# Patient Record
Sex: MALE | Race: WHITE | NOT HISPANIC OR LATINO | Employment: UNEMPLOYED | URBAN - METROPOLITAN AREA
[De-identification: names, ages, dates, MRNs, and addresses within clinical notes are randomized per-mention and may not be internally consistent; named-entity substitution may affect disease eponyms.]

---

## 2023-03-08 ENCOUNTER — HOSPITAL ENCOUNTER (EMERGENCY)
Facility: HOSPITAL | Age: 53
Discharge: HOME | End: 2023-03-08
Attending: EMERGENCY MEDICINE | Admitting: EMERGENCY MEDICINE

## 2023-03-08 VITALS
OXYGEN SATURATION: 99 % | SYSTOLIC BLOOD PRESSURE: 173 MMHG | DIASTOLIC BLOOD PRESSURE: 104 MMHG | BODY MASS INDEX: 23.95 KG/M2 | TEMPERATURE: 96 F | RESPIRATION RATE: 16 BRPM | HEIGHT: 68 IN | HEART RATE: 96 BPM | WEIGHT: 158 LBS

## 2023-03-08 DIAGNOSIS — L73.9 FOLLICULITIS: Primary | ICD-10-CM

## 2023-03-08 RX ORDER — DOXYCYCLINE 100 MG/1
100 CAPSULE ORAL 2 TIMES DAILY
Qty: 20 CAPSULE | Refills: 0 | Status: SHIPPED | OUTPATIENT
Start: 2023-03-08 | End: 2023-03-18

## 2023-03-08 NOTE — DISCHARGE INSTRUCTIONS
You were seen in the ER for a skin rash  I do NOT think it is contagious or from insects such as bed bugs  Take the antibiotics as directed  Come back to the ER if you get sick such as with a fever or vomiting or a spreading rash

## 2023-03-08 NOTE — ED PROVIDER NOTES
History  Chief Complaint   Patient presents with   • Rash     Patient presenting with scabbed over raised red lesions to arms, legs, forehead, and nose for 3 months  Patient reports being sent over from [facility] due to itching and pain from sites  49 yo man sent to ED from [address] [place] [address] for diffuse itching rash x several months  Notes associated surrounding erythema and scabs  Denies meth use  At [facility] for etoh use  No fever or systemic illness  No vomiting or weakness  None       History reviewed  No pertinent past medical history  History reviewed  No pertinent surgical history  History reviewed  No pertinent family history  I have reviewed and agree with the history as documented  E-Cigarette/Vaping   • E-Cigarette Use Never User      E-Cigarette/Vaping Substances     Social History     Tobacco Use   • Smoking status: Every Day     Packs/day: 0 50     Types: Cigarettes   • Smokeless tobacco: Never   Vaping Use   • Vaping Use: Never used   Substance Use Topics   • Alcohol use: Not Currently     Comment: in recovery   • Drug use: Never       Review of Systems   Skin: Positive for rash  Physical Exam  Physical Exam  Vitals and nursing note reviewed  Constitutional:       General: He is not in acute distress  Appearance: He is well-developed  He is not diaphoretic  HENT:      Head: Normocephalic and atraumatic  Eyes:      Conjunctiva/sclera: Conjunctivae normal       Pupils: Pupils are equal, round, and reactive to light  Neck:      Vascular: No JVD  Cardiovascular:      Rate and Rhythm: Normal rate and regular rhythm  Heart sounds: Normal heart sounds  No murmur heard  No friction rub  No gallop  Pulmonary:      Effort: Pulmonary effort is normal  No respiratory distress  Breath sounds: Normal breath sounds  No stridor  No wheezing or rales  Abdominal:      General: There is no distension  Palpations: Abdomen is soft  Tenderness:  There is no abdominal tenderness  Musculoskeletal:         General: No tenderness or deformity  Normal range of motion  Cervical back: Normal range of motion and neck supple  Skin:     General: Skin is warm and dry  Capillary Refill: Capillary refill takes less than 2 seconds  Findings: Rash present  Comments: Scattered rash on upper and lower extremities c/w folliculitis without cellulitis or abscess  Neurological:      Mental Status: He is alert and oriented to person, place, and time  Cranial Nerves: No cranial nerve deficit  Sensory: No sensory deficit  Motor: No abnormal muscle tone  Coordination: Coordination normal          Vital Signs  ED Triage Vitals [03/08/23 1611]   Temperature Pulse Respirations Blood Pressure SpO2   (!) 96 °F (35 6 °C) 96 16 (!) 173/104 99 %      Temp src Heart Rate Source Patient Position - Orthostatic VS BP Location FiO2 (%)   -- Monitor Sitting Left arm --      Pain Score       --           Vitals:    03/08/23 1611   BP: (!) 173/104   Pulse: 96   Patient Position - Orthostatic VS: Sitting         Visual Acuity      ED Medications  Medications - No data to display    Diagnostic Studies  Results Reviewed     None                 No orders to display              Procedures  Procedures         ED Course                               SBIRT 20yo+    Flowsheet Row Most Recent Value   SBIRT (23 yo +)    In order to provide better care to our patients, we are screening all of our patients for alcohol and drug use  Would it be okay to ask you these screening questions? Yes Filed at: 03/08/2023 1621   Initial Alcohol Screen: US AUDIT-C     1  How often do you have a drink containing alcohol? 0 Filed at: 03/08/2023 1621   2  How many drinks containing alcohol do you have on a typical day you are drinking? 0 Filed at: 03/08/2023 1621   3a  Male UNDER 65: How often do you have five or more drinks on one occasion?  0 Filed at: 03/08/2023 1621   Audit-C Score 0 Filed at: 03/08/2023 1621   IRISH: How many times in the past year have you    Used an illegal drug or used a prescription medication for non-medical reasons? Never Filed at: 03/08/2023 1621                    Medical Decision Making  Folliculitis: chronic illness or injury  Risk  Prescription drug management  Disposition  Final diagnoses: Folliculitis     Time reflects when diagnosis was documented in both MDM as applicable and the Disposition within this note     Time User Action Codes Description Comment    3/8/2023  4:16 PM Ly Areas Add [H56 2] Folliculitis       ED Disposition     ED Disposition   Discharge    Condition   Stable    Date/Time   Wed Mar 8, 2023  4:16 PM    Comment   Roni Sal discharge to home/self care  Follow-up Information    None         Discharge Medication List as of 3/8/2023  4:17 PM      START taking these medications    Details   doxycycline monohydrate (MONODOX) 100 mg capsule Take 1 capsule (100 mg total) by mouth 2 (two) times a day for 10 days, Starting Wed 3/8/2023, Until Sat 3/18/2023, Print             No discharge procedures on file      PDMP Review     None          ED Provider  Electronically Signed by           Yohan Garcia MD  03/08/23 2100

## 2023-03-09 ENCOUNTER — HOSPITAL ENCOUNTER (EMERGENCY)
Facility: HOSPITAL | Age: 53
Discharge: HOME | End: 2023-03-09
Attending: EMERGENCY MEDICINE

## 2023-03-09 VITALS
SYSTOLIC BLOOD PRESSURE: 128 MMHG | DIASTOLIC BLOOD PRESSURE: 82 MMHG | HEART RATE: 76 BPM | RESPIRATION RATE: 19 BRPM | TEMPERATURE: 97.9 F | OXYGEN SATURATION: 97 %

## 2023-03-09 DIAGNOSIS — R11.2 NAUSEA AND VOMITING: Primary | ICD-10-CM

## 2023-03-09 LAB
ALBUMIN SERPL BCP-MCNC: 4 G/DL (ref 3.5–5)
ALP SERPL-CCNC: 75 U/L (ref 34–104)
ALT SERPL W P-5'-P-CCNC: 15 U/L (ref 7–52)
ANION GAP SERPL CALCULATED.3IONS-SCNC: 6 MMOL/L (ref 4–13)
AST SERPL W P-5'-P-CCNC: 16 U/L (ref 13–39)
BASOPHILS # BLD AUTO: 0.03 THOUSANDS/ÂΜL (ref 0–0.1)
BASOPHILS NFR BLD AUTO: 1 % (ref 0–1)
BILIRUB SERPL-MCNC: 0.48 MG/DL (ref 0.2–1)
BUN SERPL-MCNC: 9 MG/DL (ref 5–25)
CALCIUM SERPL-MCNC: 9.4 MG/DL (ref 8.4–10.2)
CHLORIDE SERPL-SCNC: 101 MMOL/L (ref 96–108)
CO2 SERPL-SCNC: 27 MMOL/L (ref 21–32)
CREAT SERPL-MCNC: 0.77 MG/DL (ref 0.6–1.3)
EOSINOPHIL # BLD AUTO: 0.08 THOUSAND/ÂΜL (ref 0–0.61)
EOSINOPHIL NFR BLD AUTO: 2 % (ref 0–6)
ERYTHROCYTE [DISTWIDTH] IN BLOOD BY AUTOMATED COUNT: 12.4 % (ref 11.6–15.1)
GFR SERPL CREATININE-BSD FRML MDRD: 105 ML/MIN/1.73SQ M
GLUCOSE SERPL-MCNC: 99 MG/DL (ref 65–140)
HCT VFR BLD AUTO: 38.9 % (ref 36.5–49.3)
HGB BLD-MCNC: 13.6 G/DL (ref 12–17)
IMM GRANULOCYTES # BLD AUTO: 0.01 THOUSAND/UL (ref 0–0.2)
IMM GRANULOCYTES NFR BLD AUTO: 0 % (ref 0–2)
LIPASE SERPL-CCNC: 6 U/L (ref 11–82)
LYMPHOCYTES # BLD AUTO: 1.31 THOUSANDS/ÂΜL (ref 0.6–4.47)
LYMPHOCYTES NFR BLD AUTO: 25 % (ref 14–44)
MCH RBC QN AUTO: 31.9 PG (ref 26.8–34.3)
MCHC RBC AUTO-ENTMCNC: 35 G/DL (ref 31.4–37.4)
MCV RBC AUTO: 91 FL (ref 82–98)
MONOCYTES # BLD AUTO: 0.49 THOUSAND/ÂΜL (ref 0.17–1.22)
MONOCYTES NFR BLD AUTO: 10 % (ref 4–12)
NEUTROPHILS # BLD AUTO: 3.26 THOUSANDS/ÂΜL (ref 1.85–7.62)
NEUTS SEG NFR BLD AUTO: 62 % (ref 43–75)
NRBC BLD AUTO-RTO: 0 /100 WBCS
PLATELET # BLD AUTO: 258 THOUSANDS/UL (ref 149–390)
PMV BLD AUTO: 10.4 FL (ref 8.9–12.7)
POTASSIUM SERPL-SCNC: 3.6 MMOL/L (ref 3.5–5.3)
PROT SERPL-MCNC: 6.7 G/DL (ref 6.4–8.4)
RBC # BLD AUTO: 4.26 MILLION/UL (ref 3.88–5.62)
SODIUM SERPL-SCNC: 134 MMOL/L (ref 135–147)
WBC # BLD AUTO: 5.18 THOUSAND/UL (ref 4.31–10.16)

## 2023-03-09 RX ADMIN — SODIUM CHLORIDE 1000 ML: 0.9 INJECTION, SOLUTION INTRAVENOUS at 13:05

## 2023-03-09 NOTE — ED NOTES
Patient is discharged waiting on [facility]R for transportation  Discharge papers faxed over to Pauline Forbes RN  03/09/23 7174

## 2023-03-09 NOTE — ED PROVIDER NOTES
Pt Name: Enoc Ramírez  MRN: [id number]  [place] 1970  Age/Sex: 50 y o  male  Date of evaluation: 3/9/2023  PCP: No primary care provider on file  CHIEF COMPLAINT    Chief Complaint   Patient presents with   • Vomiting     Patient c/o two episodes of vomiting today which have resolved  Patient denies any abdominal pain at this time  Patient coming from [place] [address] for substance abuse  Patient was seen here yesterday for skin issues  Patient states"PMR believes the patient drank windex because there was a bottle of windex in his room by his bed"  Patient denies  HPI and MDM    50 y o  male presenting with nausea and vomiting that started yesterday, only had 2 episodes  Patient coming from [facility] rehab for substance abuse  Denies any further substance abuse  States he was started on Suboxone  He states that the staff found Windex in his room, they think he may have drank it, however he states he did not drink any Windex  Currently is asymptomatic, no pain  Differential diagnosis considered includes but not limited to electrolyte abnormalities, viral syndrome, dehydration  I considered acute surgical abdominal pathology however patient has no abdominal tenderness, currently is asymptomatic  Doubt acute surgical abdominal pathology  Blood work is overall reassuring, minimal hyponatremia, patient given IV fluids, tolerating p o  Symptoms have continued to be resolved  Advised hydration, PCP follow-up as needed, return precautions discussed  Medications   sodium chloride 0 9 % bolus 1,000 mL (0 mL Intravenous Stopped 3/9/23 4052)         Past Medical and Surgical History    Past Medical History:   Diagnosis Date   • Drug abuse [facility])        Past Surgical History:   Procedure Laterality Date   • LEG SURGERY         History reviewed  No pertinent family history      Social History     Tobacco Use   • Smoking status: Every Day     Packs/day: 0 50     Types: Cigarettes   • Smokeless tobacco: Never   Vaping Use   • Vaping Use: Never used   Substance Use Topics   • Alcohol use: Not Currently     Comment: in recovery   • Drug use: Yes     Types: "Crack" cocaine, Morphine           Allergies    Allergies   Allergen Reactions   • Haldol [Haloperidol] Other (See Comments)     Patient states"he gets all fucked up"  No specific identification of allergy  Home Medications    Prior to Admission medications    Medication Sig Start Date End Date Taking? Authorizing Provider   doxycycline monohydrate (MONODOX) 100 mg capsule Take 1 capsule (100 mg total) by mouth 2 (two) times a day for 10 days 3/8/23 3/18/23  Joe Bass MD           Physical Exam      ED Triage Vitals   Temperature Pulse Respirations Blood Pressure SpO2   03/09/23 1119 03/09/23 1118 03/09/23 1118 03/09/23 1118 03/09/23 1118   97 9 °F (36 6 °C) 80 18 (!) 147/103 98 %      Temp src Heart Rate Source Patient Position - Orthostatic VS BP Location FiO2 (%)   -- -- -- -- --             Pain Score       03/09/23 1118       No Pain               Physical Exam  Constitutional:       General: He is not in acute distress  Appearance: He is not ill-appearing  HENT:      Head: Normocephalic and atraumatic  Nose: Nose normal       Mouth/Throat:      Mouth: Mucous membranes are moist    Eyes:      Extraocular Movements: Extraocular movements intact  Pupils: Pupils are equal, round, and reactive to light  Cardiovascular:      Rate and Rhythm: Normal rate and regular rhythm  Pulmonary:      Effort: Pulmonary effort is normal  No respiratory distress  Abdominal:      General: There is no distension  Palpations: Abdomen is soft  Musculoskeletal:         General: Normal range of motion  Cervical back: Normal range of motion  No rigidity  Skin:     General: Skin is warm  Findings: No erythema  Neurological:      Mental Status: He is alert and oriented to person, place, and time   Mental status is at baseline                Diagnostic Results      Labs:    Results Reviewed     Procedure Component Value Units Date/Time    Lipase [[id number]]  (Abnormal) Collected: 03/09/23 1305    Lab Status: Final result Specimen: Blood from Arm, Left Updated: 03/09/23 1333     Lipase 6 u/L     Comprehensive metabolic panel [[id number]]  (Abnormal) Collected: 03/09/23 1305    Lab Status: Final result Specimen: Blood from Arm, Left Updated: 03/09/23 1333     Sodium 134 mmol/L      Potassium 3 6 mmol/L      Chloride 101 mmol/L      CO2 27 mmol/L      ANION GAP 6 mmol/L      BUN 9 mg/dL      Creatinine 0 77 mg/dL      Glucose 99 mg/dL      Calcium 9 4 mg/dL      AST 16 U/L      ALT 15 U/L      Alkaline Phosphatase 75 U/L      Total Protein 6 7 g/dL      Albumin 4 0 g/dL      Total Bilirubin 0 48 mg/dL      eGFR 105 ml/min/1 73sq m     Narrative:      National Kidney Disease Foundation guidelines for Chronic Kidney Disease (CKD):   •  Stage 1 with normal or high GFR (GFR > 90 mL/min/1 73 square meters)  •  Stage 2 Mild CKD (GFR = 60-89 mL/min/1 73 square meters)  •  Stage 3A Moderate CKD (GFR = 45-59 mL/min/1 73 square meters)  •  Stage 3B Moderate CKD (GFR = 30-44 mL/min/1 73 square meters)  •  Stage 4 Severe CKD (GFR = 15-29 mL/min/1 73 square meters)  •  Stage 5 End Stage CKD (GFR <15 mL/min/1 73 square meters)  Note: GFR calculation is accurate only with a steady state creatinine    CBC and differential [[id number]] Collected: 03/09/23 1305    Lab Status: Final result Specimen: Blood from Arm, Left Updated: 03/09/23 1318     WBC 5 18 Thousand/uL      RBC 4 26 Million/uL      Hemoglobin 13 6 g/dL      Hematocrit 38 9 %      MCV 91 fL      MCH 31 9 pg      MCHC 35 0 g/dL      RDW 12 4 %      MPV 10 4 fL      Platelets 554 Thousands/uL      nRBC 0 /100 WBCs      Neutrophils Relative 62 %      Immat GRANS % 0 %      Lymphocytes Relative 25 %      Monocytes Relative 10 %      Eosinophils Relative 2 %      Basophils Relative 1 % Neutrophils Absolute 3 26 Thousands/µL      Immature Grans Absolute 0 01 Thousand/uL      Lymphocytes Absolute 1 31 Thousands/µL      Monocytes Absolute 0 49 Thousand/µL      Eosinophils Absolute 0 08 Thousand/µL      Basophils Absolute 0 03 Thousands/µL           All labs reviewed and utilized in the medical decision making process    Radiology:    No orders to display       All radiology studies independently viewed by me and interpreted by the radiologist     Procedure    Procedures        FINAL IMPRESSION    Final diagnoses:   Nausea and vomiting         DISPOSITION    Time reflects when diagnosis was documented in both MDM as applicable and the Disposition within this note     Time User Action Codes Description Comment    3/9/2023  1:39 PM Clarice Davidson Add [R11 2] Nausea and vomiting       ED Disposition     ED Disposition   Discharge    Condition   Stable    Date/Time   Thu Mar 9, 2023  1:39 PM    Comment   Augie Rios discharge to home/self care  Follow-up Information     Follow up With Specialties Details Why Contact Info    [employer]  Call  To establish a family doctor to follow up with [phone]              PATIENT REFERRED TO:    Marleni Powell  [phone]    Call   To establish a family doctor to follow up with      DISCHARGE MEDICATIONS:    Discharge Medication List as of 3/9/2023  1:40 PM      CONTINUE these medications which have NOT CHANGED    Details   doxycycline monohydrate (MONODOX) 100 mg capsule Take 1 capsule (100 mg total) by mouth 2 (two) times a day for 10 days, Starting Wed 3/8/2023, Until Sat 3/18/2023, Print             No discharge procedures on file  Vaughn Gomez DO        This note was partially completed using voice recognition technology, and was scanned for gross errors; however some errors may still exist  Please contact the author with any questions or requests for clarification        Vaughn Gomez DO  03/09/23 3684

## 2023-03-09 NOTE — ED NOTES
[facility] called ED unit clerk to report that they claim to never have received discharge paperwork  Patient's discharge paperwork re faxed over by unit clerk        Nela Durbin RN  03/09/23 5479

## 2023-06-11 ENCOUNTER — HOSPITAL ENCOUNTER (EMERGENCY)
Facility: HOSPITAL | Age: 53
Discharge: HOME | End: 2023-06-11
Attending: EMERGENCY MEDICINE
Payer: COMMERCIAL

## 2023-06-11 VITALS
SYSTOLIC BLOOD PRESSURE: 156 MMHG | HEART RATE: 104 BPM | BODY MASS INDEX: 22.73 KG/M2 | WEIGHT: 150 LBS | HEIGHT: 68 IN | OXYGEN SATURATION: 96 % | RESPIRATION RATE: 20 BRPM | TEMPERATURE: 97.9 F | DIASTOLIC BLOOD PRESSURE: 86 MMHG

## 2023-06-11 DIAGNOSIS — Z00.8 ENCOUNTER FOR PSYCHOLOGICAL EVALUATION: ICD-10-CM

## 2023-06-11 DIAGNOSIS — F29 PSYCHOSIS (HCC): Primary | ICD-10-CM

## 2023-06-11 PROCEDURE — 99283 EMERGENCY DEPT VISIT LOW MDM: CPT

## 2023-06-11 RX ORDER — OLANZAPINE 5 MG/1
10 TABLET, ORALLY DISINTEGRATING ORAL ONCE
Status: COMPLETED | OUTPATIENT
Start: 2023-06-11 | End: 2023-06-11

## 2023-06-11 RX ORDER — BACITRACIN, NEOMYCIN, POLYMYXIN B 400; 3.5; 5 [USP'U]/G; MG/G; [USP'U]/G
1 OINTMENT TOPICAL ONCE
Status: COMPLETED | OUTPATIENT
Start: 2023-06-11 | End: 2023-06-11

## 2023-06-11 RX ADMIN — OLANZAPINE 10 MG: 5 TABLET, ORALLY DISINTEGRATING ORAL at 15:19

## 2023-06-11 RX ADMIN — NEOMYCIN AND POLYMYXIN B SULFATES AND BACITRACIN ZINC 1 SMALL APPLICATION: 400; 3.5; 5 OINTMENT TOPICAL at 16:32

## 2023-06-11 NOTE — ED PROVIDER NOTES
"History  Chief Complaint   Patient presents with   • Psychiatric Evaluation     Pt arrived via EMS from [facility]  Recovery  Pt was seen at [facility] yesterday for the same issue  Pt has an issue with his roommate  Pt and his roommate have been verbally yelling and threatening one another  EMS stated that PMR was not specific with what pt was saying  Pt states that he had a bad night last night and was trying not to annoy his roommate but did feel that his roommate was going to hurt him so he said he would hurt the roommate first   Pt is cooperative and answering RN's questions  74-year-old male sent from [facility] where he Is for drug rehab  He got into an argument w roommate  Denies SI, no HI, no AH, no VH  He is given medications to help w withdrawal and drug addiction  Patient seen by crisis  No further treatment advised  No medication management advised as patient is being managed at [facility]  No SI, HI, VH, AH  Does not require hospitalization  No grounds for 302  Cooperative  None       Past Medical History:   Diagnosis Date   • Addiction to drug ([facility] )    • Alcoholism ([facility] )    • [facility]        Past Surgical History:   Procedure Laterality Date   • LEG SURGERY         History reviewed  No pertinent family history  I have reviewed and agree with the history as documented  E-Cigarette/Vaping   • E-Cigarette Use Never User      E-Cigarette/Vaping Substances     Social History     Tobacco Use   • Smoking status: Every Day     Packs/day: 0 50     Types: Cigarettes   • Smokeless tobacco: Never   Vaping Use   • Vaping Use: Never used   Substance Use Topics   • Alcohol use: Not Currently     Comment: in recovery   • Drug use: Yes     Types: \"Crack\" cocaine, Morphine, Fentanyl       Review of Systems   Psychiatric/Behavioral: Positive for behavioral problems, dysphoric mood and sleep disturbance   Negative for agitation, confusion, decreased concentration, hallucinations, self-injury and suicidal " ideas  The patient is nervous/anxious  The patient is not hyperactive  All other systems reviewed and are negative  Physical Exam  Physical Exam  Vitals reviewed  Constitutional:       General: He is not in acute distress  Appearance: He is well-developed  He is not diaphoretic  HENT:      Head: Normocephalic and atraumatic  Eyes:      Conjunctiva/sclera: Conjunctivae normal    Pulmonary:      Effort: Pulmonary effort is normal  No respiratory distress  Breath sounds: Normal breath sounds  Musculoskeletal:         General: Normal range of motion  Cervical back: Normal range of motion and neck supple  Skin:     General: Skin is warm and dry  Coloration: Skin is not pale  Neurological:      Mental Status: He is alert and oriented to person, place, and time  Cranial Nerves: No cranial nerve deficit  Psychiatric:         Attention and Perception: He does not perceive auditory or visual hallucinations  Mood and Affect: Mood is anxious  Speech: Speech is tangential          Behavior: Behavior is cooperative  Thought Content: Thought content is not paranoid or delusional  Thought content does not include homicidal or suicidal ideation  Thought content does not include homicidal or suicidal plan           Vital Signs  ED Triage Vitals [06/11/23 1424]   Temp Pulse Respirations Blood Pressure SpO2   -- 104 20 156/86 96 %      Temp src Heart Rate Source Patient Position - Orthostatic VS BP Location FiO2 (%)   -- -- -- Right arm --      Pain Score       3           Vitals:    06/11/23 1424   BP: 156/86   Pulse: 104         Visual Acuity      ED Medications  Medications   OLANZapine (ZyPREXA ZYDIS) dispersible tablet 10 mg (10 mg Oral Given 6/11/23 1519)       Diagnostic Studies  Results Reviewed     None                 No orders to display              Procedures  Procedures         ED Course  ED Course as of 06/11/23 1552   Sun Jun 11, 2023   1545 Patient seen by crisis  Plan for dc back to [facility]  No medication changes advised  Patient would like to return to [facility]  SBIRT 22yo+    Flowsheet Row Most Recent Value   Initial Alcohol Screen: US AUDIT-C     1  How often do you have a drink containing alcohol? 6 Filed at: 06/11/2023 1431   2  How many drinks containing alcohol do you have on a typical day you are drinking? 1 Filed at: 06/11/2023 1431   3a  Male UNDER 65: How often do you have five or more drinks on one occasion? 6 Filed at: 06/11/2023 1431   Audit-C Score 13 Filed at: 06/11/2023 1431   Full Alcohol Screen: US AUDIT    4  How often during the last year have you found that you were not able to stop drinking once you had started? 4 Filed at: 06/11/2023 1431   5  How often during past year have you failed to do what was normally expected of you because of drinking? 4 Filed at: 06/11/2023 1431   6  How often in past year have you needed a first drink in the morning to get yourself going after a heavy drinking session? 4 Filed at: 06/11/2023 1431   7  How often in past year have you had feeling of guilt or remorse after drinking? 4 Filed at: 06/11/2023 1431   8  How often in past year have you been unable to remember what happened night before because you had been drinking? 4 Filed at: 06/11/2023 1431   9  Have you or someone else been injured as a result of your drinking? 0 Filed at: 06/11/2023 1431   10  Has a relative, friend, doctor or other health worker been concerned about your drinking and suggested you cut down? 2 Filed at: 06/11/2023 1431   AUDIT Total Score 35 Filed at: 06/11/2023 1431   IRISH: How many times in the past year have you    Used an illegal drug or used a prescription medication for non-medical reasons? Daily or Almost Daily Filed at: 06/11/2023 1431   DAST-10: In the past 12 months       1  Have you used drugs other than those required for medical reasons? 1 Filed at: 06/11/2023 1431   2   Do you use "more than one drug at a time? 1 Filed at: 06/11/2023 1431   3  Have you had medical problems as a result of your drug use (e g , memory loss, hepatitis, convulsions, bleeding, etc )? 1 Filed at: 06/11/2023 1431   4  Have you had \"blackouts\" or \"flashbacks\" as a result of drug use? YesNo 1 Filed at: 06/11/2023 1431   5  Do you ever feel bad or guilty about your drug use? 0 Filed at: 06/11/2023 1431   6  Does your spouse (or parent) ever complain about your involvement with drugs? 1 Filed at: 06/11/2023 1431   7  Have you neglected your family because of your use of drugs? 1 Filed at: 06/11/2023 1431   8  Have you engaged in illegal activities in order to obtain drugs? 0 Filed at: 06/11/2023 1431   9  Have you ever experienced withdrawal symptoms (felt sick) when you stopped taking drugs? 1 Filed at: 06/11/2023 1431   10  Are you always able to stop using drugs when you want to? 1 Filed at: 06/11/2023 1431   DAST-10 Score 8 Filed at: 06/11/2023 1431                    Medical Decision Making  Encounter for psychological evaluation  Patient has no thoughts of suicide, homicide, no auditory or visual hallucinations  No grounds for involuntary admission to hospital   Patient is being managed at [facility] recovery center and he is cleared for return for rehabilitation  No medication management advised  Risk  Prescription drug management            Disposition  Final diagnoses:   Psychosis ([facility] )   Encounter for psychological evaluation     Time reflects when diagnosis was documented in both MDM as applicable and the Disposition within this note     Time User Action Codes Description Comment    6/11/2023  2:35 PM Ismael Ford Add [F29] Psychosis ([facility] )     6/11/2023  3:46 PM Maria Eugenia Barlow Add [Z00 8] Encounter for psychological evaluation       ED Disposition     ED Disposition   Discharge    Condition   Stable    Date/Time   Sun Jun 11, 2023  3:46 PM    Comment   Stevie Garcia discharge to " home/self care  Follow-up Information     Follow up With Specialties Details Why Contact Info Additional Information    [address] [facility] Emergency Department Emergency Medicine  If symptoms worsen: if he has suicidal or homocidal thoughts, auditory or visual hallucinations, or needs further psychitric care [facility] Emergency Department, [address] [place] [address], [facility]          Patient's Medications    No medications on file       No discharge procedures on file      PDMP Review     None          ED Provider  Electronically Signed by           Aarti Clemente DO  06/11/23 6483

## 2023-06-11 NOTE — ED NOTES
PMR was called for ride back and after visit summary was faxed to the facility as well       Alek Meneses RN  06/11/23 0910

## 2023-06-11 NOTE — DISCHARGE INSTRUCTIONS
Patient was seen and evaluated  He has no suicidal or homicidal thoughts  He has no auditory or visual hallucinations    He is cleared for return to [facility] for rehab management

## 2023-06-11 NOTE — ED NOTES
PMR was re-called b/c pt is still in waiting room waiting for his ride back to [facility]  Josemanuel Barfield from [facility] stated that someone was en route for pt and will be here shortly       Ashley Mitchell RN  06/11/23 Christina Musca

## 2023-06-11 NOTE — ED NOTES
"Pt arrives to the ED from [facility] due to arguing w/roommate  Pt reports he was arguing w/roommate due to roommate becoming agitated because pt wanted to open a window in the room  Pt denies SI's / HI's and or AVH's  Pt denies any hx of MH illness \"just drug and alcohol  \" Pt reports arriving at [facility] last Sunday and will be there for aprox 28 more days  Pt is hoping to go to [place] after discharge and not returning to [facility] this time  Pt is calm, cooperative, and engages in conversation  Pt does not appear agitated and does not make any threatening statements towards anybody  Pt hopes to return to [facility] and complete program  Pt reports last use of ETOH and Mushrooms was last Sunday  Pt denies any medical issues, and or legal issues  Pt reports use of tobacco aprox a pack or less  Pt reports hx of illegal substances and ETOH  Pt is calm, cooperative and appears to have a good sense of humor  Pt does not meet criteria for IP MH tx and pt wishes to return to  [facility]      CW provided Dr Bharat Wright w/details    TDS, CW  "

## 2024-09-30 ENCOUNTER — EMERGENCY (EMERGENCY)
Facility: HOSPITAL | Age: 54
LOS: 0 days | Discharge: HOME | End: 2024-10-01
Attending: EMERGENCY MEDICINE
Payer: MEDICAID

## 2024-09-30 VITALS
HEART RATE: 87 BPM | RESPIRATION RATE: 19 BRPM | HEIGHT: 70 IN | WEIGHT: 158.07 LBS | SYSTOLIC BLOOD PRESSURE: 114 MMHG | OXYGEN SATURATION: 98 % | DIASTOLIC BLOOD PRESSURE: 76 MMHG | TEMPERATURE: 98 F

## 2024-09-30 LAB
ALBUMIN SERPL ELPH-MCNC: 4.3 G/DL — SIGNIFICANT CHANGE UP (ref 3.5–5.2)
ALP SERPL-CCNC: 75 U/L — SIGNIFICANT CHANGE UP (ref 30–115)
ALT FLD-CCNC: 12 U/L — SIGNIFICANT CHANGE UP (ref 0–41)
ANION GAP SERPL CALC-SCNC: 12 MMOL/L — SIGNIFICANT CHANGE UP (ref 7–14)
APAP SERPL-MCNC: <5 UG/ML — LOW (ref 10–30)
AST SERPL-CCNC: 17 U/L — SIGNIFICANT CHANGE UP (ref 0–41)
BASOPHILS # BLD AUTO: 0.04 K/UL — SIGNIFICANT CHANGE UP (ref 0–0.2)
BASOPHILS NFR BLD AUTO: 0.5 % — SIGNIFICANT CHANGE UP (ref 0–1)
BILIRUB DIRECT SERPL-MCNC: 0.2 MG/DL — SIGNIFICANT CHANGE UP (ref 0–0.3)
BILIRUB INDIRECT FLD-MCNC: 0.3 MG/DL — SIGNIFICANT CHANGE UP (ref 0.2–1.2)
BILIRUB SERPL-MCNC: 0.5 MG/DL — SIGNIFICANT CHANGE UP (ref 0.2–1.2)
BUN SERPL-MCNC: 15 MG/DL — SIGNIFICANT CHANGE UP (ref 10–20)
CALCIUM SERPL-MCNC: 9.3 MG/DL — SIGNIFICANT CHANGE UP (ref 8.4–10.4)
CHLORIDE SERPL-SCNC: 103 MMOL/L — SIGNIFICANT CHANGE UP (ref 98–110)
CO2 SERPL-SCNC: 26 MMOL/L — SIGNIFICANT CHANGE UP (ref 17–32)
CREAT SERPL-MCNC: 0.9 MG/DL — SIGNIFICANT CHANGE UP (ref 0.7–1.5)
EGFR: 103 ML/MIN/1.73M2 — SIGNIFICANT CHANGE UP
EOSINOPHIL # BLD AUTO: 0.3 K/UL — SIGNIFICANT CHANGE UP (ref 0–0.7)
EOSINOPHIL NFR BLD AUTO: 3.9 % — SIGNIFICANT CHANGE UP (ref 0–8)
ETHANOL SERPL-MCNC: <10 MG/DL — SIGNIFICANT CHANGE UP
FLUAV AG NPH QL: SIGNIFICANT CHANGE UP
FLUBV AG NPH QL: SIGNIFICANT CHANGE UP
GLUCOSE SERPL-MCNC: 89 MG/DL — SIGNIFICANT CHANGE UP (ref 70–99)
HCT VFR BLD CALC: 38 % — LOW (ref 42–52)
HGB BLD-MCNC: 13 G/DL — LOW (ref 14–18)
IMM GRANULOCYTES NFR BLD AUTO: 0.3 % — SIGNIFICANT CHANGE UP (ref 0.1–0.3)
LYMPHOCYTES # BLD AUTO: 2.34 K/UL — SIGNIFICANT CHANGE UP (ref 1.2–3.4)
LYMPHOCYTES # BLD AUTO: 30.4 % — SIGNIFICANT CHANGE UP (ref 20.5–51.1)
MCHC RBC-ENTMCNC: 31.9 PG — HIGH (ref 27–31)
MCHC RBC-ENTMCNC: 34.2 G/DL — SIGNIFICANT CHANGE UP (ref 32–37)
MCV RBC AUTO: 93.4 FL — SIGNIFICANT CHANGE UP (ref 80–94)
MONOCYTES # BLD AUTO: 0.53 K/UL — SIGNIFICANT CHANGE UP (ref 0.1–0.6)
MONOCYTES NFR BLD AUTO: 6.9 % — SIGNIFICANT CHANGE UP (ref 1.7–9.3)
NEUTROPHILS # BLD AUTO: 4.48 K/UL — SIGNIFICANT CHANGE UP (ref 1.4–6.5)
NEUTROPHILS NFR BLD AUTO: 58 % — SIGNIFICANT CHANGE UP (ref 42.2–75.2)
NRBC # BLD: 0 /100 WBCS — SIGNIFICANT CHANGE UP (ref 0–0)
PLATELET # BLD AUTO: 223 K/UL — SIGNIFICANT CHANGE UP (ref 130–400)
PMV BLD: 11.2 FL — HIGH (ref 7.4–10.4)
POTASSIUM SERPL-MCNC: 4 MMOL/L — SIGNIFICANT CHANGE UP (ref 3.5–5)
POTASSIUM SERPL-SCNC: 4 MMOL/L — SIGNIFICANT CHANGE UP (ref 3.5–5)
PROT SERPL-MCNC: 6.6 G/DL — SIGNIFICANT CHANGE UP (ref 6–8)
RBC # BLD: 4.07 M/UL — LOW (ref 4.7–6.1)
RBC # FLD: 13.2 % — SIGNIFICANT CHANGE UP (ref 11.5–14.5)
RSV RNA NPH QL NAA+NON-PROBE: SIGNIFICANT CHANGE UP
SALICYLATES SERPL-MCNC: <0.3 MG/DL — LOW (ref 4–30)
SARS-COV-2 RNA SPEC QL NAA+PROBE: SIGNIFICANT CHANGE UP
SODIUM SERPL-SCNC: 141 MMOL/L — SIGNIFICANT CHANGE UP (ref 135–146)
WBC # BLD: 7.71 K/UL — SIGNIFICANT CHANGE UP (ref 4.8–10.8)
WBC # FLD AUTO: 7.71 K/UL — SIGNIFICANT CHANGE UP (ref 4.8–10.8)

## 2024-09-30 PROCEDURE — 93005 ELECTROCARDIOGRAM TRACING: CPT

## 2024-09-30 PROCEDURE — 0241U: CPT

## 2024-09-30 PROCEDURE — 80076 HEPATIC FUNCTION PANEL: CPT

## 2024-09-30 PROCEDURE — 99223 1ST HOSP IP/OBS HIGH 75: CPT

## 2024-09-30 PROCEDURE — 81003 URINALYSIS AUTO W/O SCOPE: CPT

## 2024-09-30 PROCEDURE — G0378: CPT

## 2024-09-30 PROCEDURE — 80307 DRUG TEST PRSMV CHEM ANLYZR: CPT

## 2024-09-30 PROCEDURE — 80048 BASIC METABOLIC PNL TOTAL CA: CPT

## 2024-09-30 PROCEDURE — 93010 ELECTROCARDIOGRAM REPORT: CPT

## 2024-09-30 PROCEDURE — 99285 EMERGENCY DEPT VISIT HI MDM: CPT | Mod: 25

## 2024-09-30 PROCEDURE — 85025 COMPLETE CBC W/AUTO DIFF WBC: CPT

## 2024-09-30 PROCEDURE — 36415 COLL VENOUS BLD VENIPUNCTURE: CPT

## 2024-09-30 NOTE — ED PROVIDER NOTE - CLINICAL SUMMARY MEDICAL DECISION MAKING FREE TEXT BOX
Patient presented with reported suicidal ideations as documented, no other complaints.  Otherwise on arrival, patient afebrile, hemodynamically stable, neurovascularly intact.  Patient placed on 1:1 and EKG was obtained and nonischemic.  Obtained labs which were grossly unremarkable including no significant leukocytosis, anemia, signs of dehydration/NORMAN, transaminitis or significant electrolyte abnormalities.  UA negative for infection.  Patient was medically cleared in the ED.  Psych was consulted but will require time for disposition and collateral and therefore patient to be placed in observation pending psych dispo.  Patient agreeable with plan.

## 2024-09-30 NOTE — ED PROVIDER NOTE - OBJECTIVE STATEMENT
53-year-old male with past medical history of polysubstance use and depression presents to the ED for suicidal thoughts over the last few days.  Patient states he has been increasingly depressed, and plans to overdose on recreational drugs.  Patient concerned that he might act on his feelings so he came to the ED for help.  He denies other complaints. Pt denies fever, chills, nausea, vomiting, abdominal pain, diarrhea, headache, dizziness, weakness, chest pain, SOB, back pain, LOC, trauma, urinary symptoms, cough,  HI/hallucinations.

## 2024-09-30 NOTE — ED ADULT TRIAGE NOTE - CHIEF COMPLAINT QUOTE
" I've been feeling suicidal for  few days and I've been having headache for a while." 1:1 initiated in triage

## 2024-09-30 NOTE — ED PROVIDER NOTE - CONSIDERATION OF ADMISSION OBSERVATION
Patient with suicidal ideation, cleared in ED but will require obs for psych re-eval and dispo Consideration of Admission/Observation

## 2024-09-30 NOTE — ED PROVIDER NOTE - PHYSICAL EXAMINATION
VITAL SIGNS: I have reviewed nursing notes and confirm.  CONSTITUTIONAL: 54 yo M laying on stretcher; in no acute distress.  SKIN: Skin exam is warm and dry, no acute rash.  HEAD: Normocephalic; atraumatic.  EYES: PERRL, EOM intact; conjunctiva and sclera clear.  ENT: No nasal discharge; airway clear.   NECK: Supple; non tender.  CARD: S1, S2 normal; no murmurs, gallops, or rubs. Regular rate and rhythm.  RESP: No wheezes, rales or rhonchi. Speaking in full sentences.   ABD: Normal bowel sounds; soft; non-distended; non-tender; No rebound or guarding. No CVA tenderness.  EXT: Normal ROM. No clubbing, cyanosis or edema.  NEURO: Alert, oriented. Grossly unremarkable. No focal deficits.   PSYCH: Cooperative. (+) SI. No HI/hallucinations.

## 2024-09-30 NOTE — ED ADULT NURSE NOTE - OBJECTIVE STATEMENT
a/o x 3 calm and cooperative c/o SI. pt states he recently relapsed on crack/cocaine denies use today, denies alcohol use. Pt in green scrubs w/ no belongings at the bedside, pt on constant observation

## 2024-09-30 NOTE — ED PROVIDER NOTE - ATTENDING APP SHARED VISIT CONTRIBUTION OF CARE
53 year old male pmhx as documented presenting with suicidal ideation x several days with a plan to overdose on either recreational drugs or other medications. Denies prior suicide attempts. Also denies HI/hallucinations or any other active medical complaints. Triage note states that patient was complaining of headache, but patient denying headaches at this time.     Vital Signs: I have reviewed the initial vital signs.  Constitutional: NAD, well-nourished, appears stated age, no acute distress.  HEENT: Airway patent, moist MM, no erythema/swelling/deformity of oral structures. EOMI, PERRLA.  CV: regular rate, regular rhythm, well-perfused extremities, 2+ b/l DP and radial pulses equal.  Lungs: BCTA, no increased WOB.  ABD: NTND, no guarding or rebound, no pulsatile mass, no hernias.   MSK: Neck supple, nontender, nl ROM, no stepoff. Chest nontender. Back nontender in TLS spine or to b/l bony structures or flanks. Ext nontender, nl rom, no deformity.   INTEG: Skin warm, dry, no rash.  NEURO: A&Ox3, normal strength, nl sensation throughout, normal speech.   PSYCH: Calm, cooperative, normal affect and interaction.    Patient on 1:1, NAD, cooperative. Will obtain labs for clearance, consult psych

## 2024-10-01 VITALS
OXYGEN SATURATION: 98 % | HEART RATE: 56 BPM | DIASTOLIC BLOOD PRESSURE: 84 MMHG | SYSTOLIC BLOOD PRESSURE: 124 MMHG | RESPIRATION RATE: 18 BRPM | TEMPERATURE: 98 F

## 2024-10-01 DIAGNOSIS — F19.90 OTHER PSYCHOACTIVE SUBSTANCE USE, UNSPECIFIED, UNCOMPLICATED: ICD-10-CM

## 2024-10-01 PROBLEM — F19.10 OTHER PSYCHOACTIVE SUBSTANCE ABUSE, UNCOMPLICATED: Chronic | Status: ACTIVE | Noted: 2024-07-19

## 2024-10-01 LAB
APPEARANCE UR: CLEAR — SIGNIFICANT CHANGE UP
BILIRUB UR-MCNC: NEGATIVE — SIGNIFICANT CHANGE UP
COLOR SPEC: YELLOW — SIGNIFICANT CHANGE UP
DIFF PNL FLD: NEGATIVE — SIGNIFICANT CHANGE UP
GLUCOSE UR QL: NEGATIVE MG/DL — SIGNIFICANT CHANGE UP
KETONES UR-MCNC: NEGATIVE MG/DL — SIGNIFICANT CHANGE UP
LEUKOCYTE ESTERASE UR-ACNC: NEGATIVE — SIGNIFICANT CHANGE UP
NITRITE UR-MCNC: NEGATIVE — SIGNIFICANT CHANGE UP
PH UR: 6 — SIGNIFICANT CHANGE UP (ref 5–8)
PROT UR-MCNC: NEGATIVE MG/DL — SIGNIFICANT CHANGE UP
SP GR SPEC: 1.01 — SIGNIFICANT CHANGE UP (ref 1–1.03)
UROBILINOGEN FLD QL: 1 MG/DL — SIGNIFICANT CHANGE UP (ref 0.2–1)

## 2024-10-01 PROCEDURE — 90792 PSYCH DIAG EVAL W/MED SRVCS: CPT | Mod: 95,GC

## 2024-10-01 PROCEDURE — 99239 HOSP IP/OBS DSCHRG MGMT >30: CPT

## 2024-10-01 NOTE — ED CDU PROVIDER INITIAL DAY NOTE - NSICDXNOFAMILYHX_GEN_ALL_ED
I have told her that effusion normal finding after the surgery she has had. I do not believe that is causing her symptoms. We can try a different ENT group if she would like   <-- Click to add NO pertinent Family History

## 2024-10-01 NOTE — ED BEHAVIORAL HEALTH ASSESSMENT NOTE - RISK ASSESSMENT
Suicide risk factors:   Modifiable risk factors include recent psychosocial stressors, substance use/intoxication, unemployment, hx of SA  Static risk factors include male gender,  race, prior SA  Protective factors include social support/family connectiveness, [place], seeking out treatment/hopefullness; domiciled status; future-orientation; lack of current suicidality or homicidally; lack of urges to self-harm or engage in NSSIB; identifies various reasons for living; willingness and ability to readily engage in safety planning with this MD

## 2024-10-01 NOTE — ED BEHAVIORAL HEALTH ASSESSMENT NOTE - HPI (INCLUDE ILLNESS QUALITY, SEVERITY, DURATION, TIMING, CONTEXT, MODIFYING FACTORS, ASSOCIATED SIGNS AND SYMPTOMS)
Mr. Justin is a 54 yo man, domiciled with extended family in [place], unemployed on SSI, father of 2 children not under his custody, PPHx of polysubstance use (alcohol, MJ, cocaine, opioids, mushrooms, nicotine) with multiple inpatient detox stays including most recently at [facility] in [place] in 7/2024, reported 5 prior IPP admissions including most recently 7/2024 at [facility], h/o SA 3 years ago by cutting his wrist and jumping from the 2nd  the alf, not currently in psychiatric treatment, presented to the ED for suicidal ideation around 7PM last night.     Upon approach, patient was seated on the hospital exam table, appearing cheerful, calm, and cooperative. Patient states that he came to the hospital last night after having a quarrel with his family after he met with a friend who is a "drug addict." Patient then left home, walked to [place] (because walking helps to cool him down), and took the ferry to [place], and took a bus  to the hospital to ask for help. When asked about suicidal thoughts, patient states "kind of," and denies having any plans. Patient states he had dinner, slept well, and had breakfast and coffee this morning, and feels better. Patient denies having suicidal thoughts at this time and would like to leave to attend AA meeting at [address] and return home. He mentions using substances (crack 2 days ago, cannabis couple joints yesterday, and alcohol yesterday). Patient denies any SI/HI/AVH/paranoia.     Collateral attempted for his cousin Bruno, [phone]; unsuccessful Mr. Justin is a 54 yo man, domiciled with extended family in [place], unemployed on SSI, father of 2 children not under his custody, PPHx of polysubstance use (alcohol, MJ, cocaine, opioids, mushrooms, nicotine) with multiple inpatient detox stays including most recently at [facility] in [place] in 7/2024, reported 5 prior IPP admissions including most recently 7/2024 at [facility], h/o SA 3 years ago by cutting his wrist and jumping from the 2nd  the [facility], not currently in psychiatric treatment, presented to the ED for suicidal ideation around 7PM last night.     Upon approach, patient was seated on the hospital exam table, appearing cheerful, calm, and cooperative. Patient states that he came to the hospital last night after having a quarrel with his family after he met with a friend who is a "drug addict." Patient then left home, walked to [place] (because walking helps to cool him down), and took the ferry to [place], and took a bus  to the hospital to ask for help. When asked about suicidal thoughts, patient states "kind of," and denies having any plans. Patient states he had dinner, slept well, and had breakfast and coffee this morning, and feels better. Patient denies having suicidal thoughts at this time and would like to leave to attend AA meeting at [facility] and [place] and return home. He mentions using substances (crack 2 days ago, cannabis couple joints yesterday, and alcohol yesterday). Patient denies any SI/HI/AVH/paranoia.     Collateral attempted for his cousin Bruno, [phone]; unsuccessful; left VM requesting a call back.

## 2024-10-01 NOTE — ED CDU PROVIDER DISPOSITION NOTE - CLINICAL COURSE
Pt came in with SI. Denies HI/AH/VH. Pt placed in obs for psyc eval. Psyc evaluated pt and cleared for dc. Pt agrees with plan. Will d/c.

## 2024-10-01 NOTE — ED BEHAVIORAL HEALTH ASSESSMENT NOTE - NSBHATTESTCOMMENTATTENDFT_PSY_A_CORE
Patient seen and examined with Dr. Washington. Chart reviewed. Case discussed with EM attending. Patient reports feeling "better" after having rested overnight and taken a "long walk" which he identifies as a coping skills. Reports having an argument with his family which led  him to walk to the ED in frustration. This morning he is awake, alert and fully oriented; he is clinically sober. He adamantly denies any suicidal ideation, intent or plan. Denies any violent/aggressive ideation.  Patient is not endorsing any acutely dangerous psychiatric symptoms or any symptoms consistent with an acute mood or psychotic disorder that would be amenable to inpatient treatment. Patient is also not exhibiting any acutely dangerous behaviors.  Patient is requesting to go home and is able to engage readily in safety planning. Patient does not meet criteria for involuntary psychiatric hospitalization. Agree with assessment and plan as detailed by Dr. Washington.

## 2024-10-01 NOTE — ED CDU PROVIDER INITIAL DAY NOTE - PHYSICAL EXAMINATION
VITAL SIGNS: I have reviewed nursing notes and confirm.  CONSTITUTIONAL: 52 yo M laying on stretcher; in no acute distress.  SKIN: Skin exam is warm and dry, no acute rash.  HEAD: Normocephalic; atraumatic.  EYES: PERRL, EOM intact; conjunctiva and sclera clear.  ENT: No nasal discharge; airway clear.   NECK: Supple; non tender.  CARD: S1, S2 normal; no murmurs, gallops, or rubs. Regular rate and rhythm.  RESP: No wheezes, rales or rhonchi. Speaking in full sentences.   ABD: Normal bowel sounds; soft; non-distended; non-tender; No rebound or guarding. No CVA tenderness.  EXT: Normal ROM. No clubbing, cyanosis or edema.  NEURO: Alert, oriented. Grossly unremarkable. No focal deficits.   PSYCH: Cooperative. (+) SI. No HI/hallucinations.

## 2024-10-01 NOTE — ED CDU PROVIDER SUBSEQUENT DAY NOTE - PHYSICAL EXAMINATION
Vital Signs: I have reviewed the initial vital signs.  Constitutional: appears stated age, no acute distress  Eyes: Sclera clear, EOMI.  Cardiovascular: S1 and S2, regular rate, regular rhythm, well-perfused extremities, radial pulses equal and 2+, pedal pulses 2+ and equal  Respiratory: unlabored respiratory effort, clear to auscultation bilaterally no wheezing, rales, or rhonchi  Musculoskeletal: supple neck, no lower extremity edema  Integumentary: warm, dry, no rash  Neurologic: awake, alert, oriented x3, extremities’ motor and sensory functions grossly intact

## 2024-10-01 NOTE — ED CDU PROVIDER DISPOSITION NOTE - PATIENT PORTAL LINK FT
You can access the FollowMyHealth Patient Portal offered by [facility] by registering at the following website: [email or web address]. By joining [facility]’s FollowMyHealth portal, you will also be able to view your health information using other applications (apps) compatible with our system.

## 2024-10-01 NOTE — ED CDU PROVIDER DISPOSITION NOTE - NSFOLLOWUPCLINICS_GEN_ALL_ED_FT
stated [facility]  OP Mental Health  [address] [place] [id number]  Phone: [phone]  Fax:   Follow Up Time: 4-6 Days

## 2024-10-01 NOTE — ED BEHAVIORAL HEALTH ASSESSMENT NOTE - SUMMARY
Mr. Justin is a 54 yo man, domiciled with extended family in [place], unemployed on [facility] [employer], father of 2 children not under his custody, PPHx of polysubstance use (alcohol, MJ, cocaine, opioids, mushrooms, nicotine) with multiple inpatient detox stays including most recently at [facility] in [place] in 7/2024, reported 5 prior IPP admissions including most recently 7/2024 at [facility], h/o SA 3 years ago by cutting his wrist and jumping from the 2nd  the custodial, not currently in psychiatric treatment, presented to the ED for suicidal ideation around 7PM last night.     Upon evaluation, patient was calm, cooperative, euthymic, cheerful, linear thought process, reality based thought content, goal-directed, and future oriented. Patient reports improved mood after eating and sleeping, and denies current SI or any other psychiatric symptoms (denies depression/anxiety/paranoia/SI/HI/AVH), This clinical presentation, in which patient developed fleeting passive suicidal ideation (with no plan or intent) in the setting of recent stressor and substance use is concerning for adjustment disorder vs substance induced mood disorder.     Patient is at a chronically elevated suicide risk due to static factors (hx of polysubstance use, , male, unemployment, current substance use, hx of SA, limited coping skills), however his acute risk is deemed to be low as patient is currently denying any suicidal ideation/plan/intent, denies any psychiatric symptoms (mood/psychotic), and he is future oriented, goal directed, well engaged, cooperative, and able to engage in safety planning.  Furthermore, in his previous hospitalization (7/19-7/23/24) , he denied any suicidal ideation during the hospitalization and his mood improved without medication (patient declined to take medications when prozac was offered),  Inpatient psychiatric hospitalization would unlikely benefit the patient at this time as his chronic risks are not effectively mitigated in the inpatient setting. Patient would benefit from refraining from using substances and learning healthy coping skills and building frustration tolerance in an outpatient basis.     Impression: Adjustment disorder vs substance induced mood disorder vs polysubstance use disorder     Recommendation:    1. No indication for inpatient psychiatric hospitalization     2. Patient declines referral details for outpatient resources    3. Patient completed safety planning and was given instructions to call [phone] / [phone] and return to the ED with any mental health crisis.

## 2024-10-01 NOTE — ED CDU PROVIDER INITIAL DAY NOTE - NSICDXNOPASTSURGICALHX_GEN_ALL_ED
<-- Click to add NO significant Past Surgical History Graft Donor Site Bandage (Optional-Leave Blank If You Don't Want In Note): Steri-strips and a pressure bandage were applied to the donor site.

## 2024-10-01 NOTE — ED CDU PROVIDER DISPOSITION NOTE - NSFOLLOWUPINSTRUCTIONS_ED_ALL_ED_FT
Follow-up with [facility] outpatient mental health clinic.    Suicidal Feelings: How to Help Yourself  Suicide is when you end your own life. Suicidal ideation includes expressing thoughts about, or a preoccupation with, ending your own life. There are many things you can do to help yourself feel better when struggling with these feelings. Many services and people are available to support you and others who struggle with similar feelings.    If you ever feel like you may hurt yourself or others, or have thoughts about taking your own life, get help right away. To get help:  Go to your nearest emergency department.  Call your local emergency services (911 in the [place]  Call the [facility] and human services helpline (211 in the [place]  Call or text a suicide hotline to speak with a trained counselor. The following suicide hotlines are available in the [place]:  7-316-296-TALK (1-485.966.3570 or 651 in the [place]  4-517-DBDFTDR (1-915.903.1634).  Text 773862. This is the Crisis Text Line in the [place]  1-854.200.6489. This is a hotline for [place]i speakers.  1-665.600.9142. This is a hotline for TTY users.  7-179-3-U-PETE (1-647.995.7015). This is a hotline for lesbian, alvarado, bisexual, transgender, or questioning youth.  For a list of hotlines in [place], visit suicide.org/hotlines/international/uagkfy-ylqleyq-wilidbgb.html  Contact a crisis center or a local suicide prevention center. To find a crisis center or suicide prevention center:  Call your local hospital, clinic, community service organization, mental health center, social service provider, or health department. Ask for help with connecting to a crisis center.  For a list of crisis centers in the [place], visit: suicidepreventionlifeline.org  For a list of crisis centers in [place], visit: suicideprevention.ca  How to help yourself feel better  A teen talking with an adult.  Promise yourself that you will not do anything bad or extreme when you have suicidal feelings. Remember the times you have felt hopeful.  Many people have gotten through suicidal thoughts and feelings, and you can too.  If you have had these feelings before, remind yourself that you can get through them again.  Let family, friends, teachers, or counselors know how you are feeling. Do not separate yourself from those who care about you and want to help you.  Talk with someone every day, even if you do not feel like talking to anyone or being with other people.  Face-to-face conversation is best to help them understand your feelings.  Contact a mental health care provider and work with this person regularly.  Make a safety plan that you can follow during a crisis.  Include phone numbers of suicide prevention hotlines, mental health professionals, and trusted friends and family members you can call during an emergency.  Save these numbers on your phone.  If you are thinking of taking a lot of medicine, give your medicine to someone who can give it to you as prescribed.  If you are on antidepressants and are concerned you will overdose, tell your health care provider so that he or she can give you safer medicines.  Try to stick to your routines and follow a schedule every day. Make self-care a priority.  Make a list of realistic goals, and cross them off when you achieve them. Accomplishments can give you a sense of worth.  Wait until you are feeling better before doing things that you find difficult or unpleasant.  Do things that you have always enjoyed to take your mind off your feelings.  Try reading a book, or listening to or playing music.  Spending time outside, in nature, may help you feel better.  Follow these instructions at home:  A sign asking the reader not to drink beer, wine, or hard liquor.   Visit your primary health care provider every year for a physical and a mental health checkup.  Take over-the-counter and prescription medicines only as told by your health care provider.  Ask your health care provider about the possible side effects of any medicines you are taking.  Ask your health care provider about whether suicidal ideation is a possible side effect of any of your medicines.  Learn about suicidal ideation and what increases the risk for the development of suicidal thoughts.  Eat a well-balanced diet, and eat regular meals.  Get plenty of rest.  Exercise if you are able. Just 30 minutes of exercise each day can help you feel better.  Keep your living space well lit.  Do not use alcohol or drugs. Remove these substances from your home.  General recommendations  Remove weapons, poisons, knives, and other deadly items from your home.  Work with a mental health care provider as needed.  When you are feeling well, write yourself a letter with tips and support that you can read when you are not feeling well.  Remember that life's difficulties can be sorted out with help. Conditions can be treated, and you can learn behaviors and ways of thinking that will help you.  Work with your health care provider or counselor to learn ways of coping with your thoughts and feelings.  Where to find more information  National Suicide Prevention Lifeline: www.suicidepreventionlifeline.org  Hopeline: www.hopeline.com  American Foundation for Suicide Prevention: www.afsp.org  The [place] Project (for lesbian, alvarado, bisexual, transgender, or questioning youth): www.thetrevorproject.org  National [place] of Mental Health: www.nimh.nih.gov/health/topics/suicide-prevention  Suicide Prevention Resources: afsp.org/suicide-prevention-resources  Contact a health care provider if:  You feel as though you are a burden to others.  You feel agitated, angry, vengeful, or have extreme mood swings.  You have withdrawn from family and friends.  You are frequently using drugs or alcohol.  Get help right away if:  You are talking about suicide or wishing to die.  You start making plans for how to commit suicide.  You feel that you have no reason to live.  You start making plans for putting your affairs in order, saying goodbye, or giving your possessions away.  You feel guilt, shame, or unbearable pain, and it seems like there is no way out.  You are engaging in risky behaviors that could lead to death.  If you have any of these thoughts or symptoms, get help right away:  Go to your nearest emergency department or crisis center.  Call emergency services (911 in the [place]  Call or text a suicide crisis helpline.  Summary  Suicide is when you take your own life. Suicidal feelings are thoughts about ending your own life.  Promise yourself that you will not do anything bad or extreme when you have suicidal feelings.  Let family, friends, teachers, or counselors know how you are feeling.  Get help right away if you start making plans for how to commit suicide.

## 2024-10-01 NOTE — ED CDU PROVIDER INITIAL DAY NOTE - ATTENDING APP SHARED VISIT CONTRIBUTION OF CARE
53-year-old male, past medical history of polysubstance use and depression, presents to the ED for suicidal thoughts over the last few days.  Patient states he has been increasingly depressed, and plans to overdose on recreational drugs.  Patient concerned that he might act on his feelings so he came to the ED for help.  He denies other complaints. Pt denies fever, chills, nausea, vomiting, abdominal pain, diarrhea, headache, dizziness, weakness, chest pain, SOB, back pain, LOC, trauma, urinary symptoms, cough,  HI/hallucinations. On exam, pt in NAD, AAOx3, head NC/AT, CN II-XII intact, PEERL, EOMi, neck (-) midline tenderness, lungs CTA B/L, CV S1S2 regular, abdomen soft/NT/ND/(+)BS, ext (-) edema, motor 5/5x4, sensation intact, ambulating with steady gait. Pt in obs pending psyc work up.

## 2024-10-01 NOTE — ED BEHAVIORAL HEALTH ASSESSMENT NOTE - DESCRIPTION
n/a CBC largely unremarkable, CMP unremarkable, UA negative, salicylate, acetaminophen, and alcohol were negative, COVID, flu, RSV negative, EKG normal sinus rhythm. Rogelio Kaur MSW spoke with RN: "Per RN, patient arrived with belongings. All belongings were provided to hospital security, and patient currently in a gown with a 1:1 staff member.    BEHAVIOR: RN described patient to be calm, remains in behavioral and impulse control, and is not in restraints. Pt currently has 1:1 sitter.  Pt is not displaying aggression towards staff or others. Per RN, pt presenting with depressed affect and mood is congruent with affect. Pt’s speech is linear . RN stated that the patient is endorsing SI with no concrete plan. Per RN pt denying A/VH. RN reported that the patient appears to have fair hygiene. No new cuts on body or arms." domiciled, on [facility]

## 2024-10-01 NOTE — ED CDU PROVIDER SUBSEQUENT DAY NOTE - CLINICAL SUMMARY MEDICAL DECISION MAKING FREE TEXT BOX
Pt placed in obs for depression and SI. Pt is calm and cooperative now. Pt is cleared by psyc. Will d/c with outpt follow up.

## 2024-10-01 NOTE — ED CDU PROVIDER DISPOSITION NOTE - ATTENDING APP SHARED VISIT CONTRIBUTION OF CARE
Pt came in with SI. Denies HI/AH/VH. Pt placed in obs for psyc eval. Psyc evaluated pt and cleared for dc. Pt agrees with plan. Will d/c. I have personally seen and examined this patient. I have fully participated in the care of this patient. I have made amendments to the documentation where appropriate and otherwise agree with the history, physical exam, and plan as documented by the [facility].     Attending Contribution to care: This is my contribution to care      Pt came in with SI. Fariha HI//VH. Pt placed in obs for psyc eval. Psyc evaluated pt and cleared for dc. Pt agrees with plan. Will d/c.

## 2024-10-01 NOTE — ED BEHAVIORAL HEALTH NOTE - BEHAVIORAL HEALTH NOTE
===================    PRE-HOSPITAL COURSE    ==================    SOURCE: RN and ED documentation     DETAILS:  Pt BiBself to ED for worsening SI         ============    ED COURSE    ============    SOURCE: RN and secondhand ED documentation.    ARRIVAL: Per RN patient BiBself to ED. Patient cooperative with triage process.     BELONGINGS: Per RN, patient arrived with belongings. All belongings were provided to hospital security, and patient currently in a gown with a 1:1 staff member.    BEHAVIOR: RN described patient to be calm, remains in behavioral and impulse control, and is not in restraints. Pt currently has 1:1 sitter.  Pt is not displaying aggression towards staff or others. Per RN, pt presenting with depressed affect and mood is congruent with affect. Pt’s speech is linear . RN stated that the patient is endorsing SI with no concrete plan. Per RN pt denying A/VH. RN reported that the patient appears to have fair hygiene. No new cuts on body or arms.    TREATMENT: No medication administered in ED. No restraints.     VISITORS: None currently     -----------------------------------------------   COVID Exposure Screen- collateral (i.e. third-party, chart review, belongings, etc; include EMS and ED staff)   ---------------------------------------------------   1. Has the patient had a COVID-19 test in the last 90 days? Unknown.   2. Has the patient tested positive for COVID-19 antibodies? Unknown.   3.Has the patient received 2 doses of the COVID-19 vaccine?  Unknown.   4. In the past 10 days, has the patient been around anyone with a positive COVID-19 test?* Unknown.   5.Has the patient been out of [place] within the past 10 days? Unknown.